# Patient Record
Sex: FEMALE | Race: WHITE | Employment: OTHER
[De-identification: names, ages, dates, MRNs, and addresses within clinical notes are randomized per-mention and may not be internally consistent; named-entity substitution may affect disease eponyms.]

---

## 2023-10-02 ENCOUNTER — HOSPITAL ENCOUNTER (OUTPATIENT)
Dept: PHYSICAL THERAPY | Facility: HOSPITAL | Age: 68
Setting detail: RECURRING SERIES
Discharge: HOME | End: 2023-10-05
Payer: MEDICARE

## 2023-10-02 PROCEDURE — 97161 PT EVAL LOW COMPLEX 20 MIN: CPT | Performed by: PHYSICAL THERAPIST

## 2023-10-02 NOTE — THERAPY EVALUATION
she is able to walk faster. She has been avoiding hip flexion activities for the past 2 weeks and her pain much better overall. She is no longer experiencing pain at 3/4 mile. Start of Care: 10/2/2023  PLOF: complete 20 minutes of exercise at least 3 times a week  Limitations to PLOF/Activity or Recreational Limitations: NA  Work Hx: retired  Living Situation: independent  Mobility: independent  [facility]: independent  Previous Treatment/Compliance: none  PMHx/Surgical Hx/Comorbidites: Osteoporosis  Prior Hospitalization:  NA  Barriers: []pain []Financial []time []transportation []Other:  Substance use: []Alcohol []Tobacco []other:   Pt Goals: resume normal activity without pain or reinjury  Motivation: high  Cognition: A & O x 3              OBJECTIVE    Posture:  even weight distribution in standing  Other Observations:  --  Gait and Functional Mobility:  squat indicated deviation to the right LE  Palpation: tenderness at rectus femoris proximal muscle region    Left Hip PROM:  Flex 108 P!, ER 60, IR 25 P!   Right Hip PROM: Flex 145, ER 60, IR 30    Joint Mobility Assessment: left hip inferior hypomobility    Flexibility: --      LOWER QUARTER   MUSCLE STRENGTH  KEY       R  L  0 - No Contraction  Knee ext  5  5  1 - Trace            flex  5  5  2 - Poor   Hip ext   5  5  3 - Fair          flex   5  4 P!  4 - Good         abd  5  5  5 - Normal         add  5  5      Ankle DF  5  5                PF  5  5                INV  5  5                EV  5  5      Neurological: Reflexes / Sensations: normal  Special Tests:       Hip Compression: Left positive     Hip Scour: left positive        Objective/Functional Outcome Measure:   FOTO Score: 91/100  FOTO score = an established functional score where 100 = no disability      55 min [x]Eval - untimed                        Pain Level at end of session (0-10 scale): 0/10    Plan of Care / Statement of Necessity for Physical Therapy Services     Assessment / key

## 2023-10-06 ENCOUNTER — HOSPITAL ENCOUNTER (OUTPATIENT)
Dept: PHYSICAL THERAPY | Facility: HOSPITAL | Age: 68
Setting detail: RECURRING SERIES
Discharge: HOME | End: 2023-10-09
Payer: MEDICARE

## 2023-10-06 PROCEDURE — 97140 MANUAL THERAPY 1/> REGIONS: CPT | Performed by: PHYSICAL THERAPIST

## 2023-10-06 PROCEDURE — 97110 THERAPEUTIC EXERCISES: CPT | Performed by: PHYSICAL THERAPIST

## 2023-10-06 NOTE — PROGRESS NOTES
PHYSICAL THERAPY - MEDICARE DAILY TREATMENT NOTE (updated 3/23)      Date: 10/6/2023          Patient Name:  Ramón Lucero :  1955   Medical   Diagnosis:  Left hip pain [M25.552] Treatment Diagnosis:  M25.552  LEFT HIP PAIN    Referral Source:  Carolina Baumgarten, MD Insurance:   Payor: MEDICARE / Plan: MEDICARE PART A AND B / Product Type: *No Product type* /                     Patient  verified yes     Visit #   Current  / Total 2 16   Time   In / Out 833a 938a   Total Treatment Time 65   Total Timed Codes 65   1:1 Treatment Time 61      [facility] Totals Reminder:  bill using total billable   min of TIMED therapeutic procedures and modalities. 8-22 min = 1 unit; 23-37 min = 2 units; 38-52 min = 3 units; 53-67 min = 4 units; 68-82 min = 5 units            SUBJECTIVE    Pain Level (0-10 scale): 1/10    Any medication changes, allergies to medications, adverse drug reactions, diagnosis change, or new procedure performed?: [x] No    [] Yes (see summary sheet for update)  Medications: Verified on Patient Summary List    Subjective functional status/changes:     Pt states that her left hip is more sore today as she walked through some tall grass yesterday requiring greater hip flexion compared to normal walking. OBJECTIVE      Therapeutic Procedures: Tx Min Billable or 1:1 Min (if diff from Tx Min) Procedure, Rationale, Specifics   57 [phone] [id number] Therapeutic Exercise (timed):  increase ROM, strength, coordination, balance, and proprioception to improve patient's ability to progress to PLOF and address remaining functional goals. (see flow sheet as applicable)     Details if applicable:  passive hip ER and IR   8  56423 Manual Therapy (timed):  decrease pain, increase ROM, and increase tissue extensibility to improve patient's ability to progress to PLOF and address remaining functional goals.   The manual therapy interventions were performed at a separate and distinct time from the therapeutic

## 2023-10-10 ENCOUNTER — HOSPITAL ENCOUNTER (OUTPATIENT)
Dept: PHYSICAL THERAPY | Facility: HOSPITAL | Age: 68
Setting detail: RECURRING SERIES
Discharge: HOME | End: 2023-10-13
Payer: MEDICARE

## 2023-10-10 PROCEDURE — 97110 THERAPEUTIC EXERCISES: CPT

## 2023-10-10 PROCEDURE — 97140 MANUAL THERAPY 1/> REGIONS: CPT

## 2023-10-10 NOTE — PROGRESS NOTES
in tall grass without pain- progressing    PLAN  Yes  Continue plan of care  Re-Cert Due: 5/2/01  [x]  Upgrade activities as tolerated  []  Discharge due to :  []  Other:      Mayo Pastor, PT       10/10/2023       9:04 AM

## 2023-10-13 ENCOUNTER — APPOINTMENT (OUTPATIENT)
Dept: PHYSICAL THERAPY | Facility: HOSPITAL | Age: 68
End: 2023-10-13
Payer: MEDICARE

## 2023-10-17 ENCOUNTER — HOSPITAL ENCOUNTER (OUTPATIENT)
Dept: PHYSICAL THERAPY | Facility: HOSPITAL | Age: 68
Setting detail: RECURRING SERIES
Discharge: HOME | End: 2023-10-20
Payer: MEDICARE

## 2023-10-17 PROCEDURE — 97140 MANUAL THERAPY 1/> REGIONS: CPT | Performed by: PHYSICAL THERAPIST

## 2023-10-17 PROCEDURE — 97110 THERAPEUTIC EXERCISES: CPT | Performed by: PHYSICAL THERAPIST

## 2023-10-17 NOTE — PROGRESS NOTES
separate and distinct time from the therapeutic activities interventions . (see flow sheet as applicable)     Details if applicable:    left hip distraction and inferior mobilization grade 3-4     62 48    Total Total         [x]  Patient Education billed concurrently with other procedures   [x] Review HEP    [] Progressed/Changed HEP, detail:    [] Other detail:         Other Objective/Functional Measures  Hip PROM IR 26 P! --> 39    Pain Level at end of session (0-10 scale): 0/10      Assessment   Advance hip IR ROM following manual.  Progressing resistance load without pain. Patient will continue to benefit from skilled PT / OT services to modify and progress therapeutic interventions, analyze and address functional mobility deficits, analyze and address ROM deficits, analyze and address strength deficits, analyze and address soft tissue restrictions, analyze and cue for proper movement patterns, analyze and modify for postural abnormalities, and instruct in home and community integration to address functional deficits and attain remaining goals. Progress toward goals / Updated goals:  []  See Progress Note/Recertification    Short Term Goals: To be accomplished in 4-6 treatments. 1)  Pt will be Independent with HEP- progressing  2)  Pt will be able to stand from sitting >/= 30 minutes without pain- progressing  3) Pt will be able to Ambulate >/= 2 miles without pain- progressing     Long Term Goals: To be accomplished in 12-16 treatments.   1)  Pt will be able to Navigate one flight of stairs without pain- progressing  2)  Pt will be able to Ambulate >/= 2.5 miles without pain- progressing  3) Pt will be able to squat to retrieve item from ground without increase of pain- progressing  4) Pt will be wilberto to walk in tall grass without pain- progressing    PLAN  Yes  Continue plan of care  Re-Cert Due: 5/9/77  [x]  Upgrade activities as tolerated  []  Discharge due to :  []  Other:      Kaylah Germain, PT

## 2023-10-20 ENCOUNTER — HOSPITAL ENCOUNTER (OUTPATIENT)
Dept: PHYSICAL THERAPY | Facility: HOSPITAL | Age: 68
Setting detail: RECURRING SERIES
Discharge: HOME | End: 2023-10-23
Payer: MEDICARE

## 2023-10-20 PROCEDURE — 97140 MANUAL THERAPY 1/> REGIONS: CPT | Performed by: PHYSICAL THERAPIST

## 2023-10-20 PROCEDURE — 97110 THERAPEUTIC EXERCISES: CPT | Performed by: PHYSICAL THERAPIST

## 2023-10-20 NOTE — PROGRESS NOTES
PHYSICAL THERAPY - MEDICARE DAILY TREATMENT NOTE (updated 3/23)      Date: 10/20/2023          Patient Name:  Zoya Vega :  1955   Medical   Diagnosis:  Left hip pain [M25.552] Treatment Diagnosis:  M25.552  LEFT HIP PAIN    Referral Source:  Saul Arreola MD Insurance:   Payor: MEDICARE / Plan: MEDICARE PART A AND B / Product Type: *No Product type* /                     Patient  verified yes     Visit #   Current  / Total 5 16   Time   In / Out 917a 1006a   Total Treatment Time 49   Total Timed Codes 49   1:1 Treatment Time 40      [facility] Totals Reminder:  bill using total billable   min of TIMED therapeutic procedures and modalities. 8-22 min = 1 unit; 23-37 min = 2 units; 38-52 min = 3 units; 53-67 min = 4 units; 68-82 min = 5 units            SUBJECTIVE    Pain Level (0-10 scale): 0/10    Any medication changes, allergies to medications, adverse drug reactions, diagnosis change, or new procedure performed?: [x] No    [] Yes (see summary sheet for update)  Medications: Verified on Patient Summary List    Subjective functional status/changes: The Pt reports that she is noticing her left knee occasionally painful navigating stairs. OBJECTIVE      Therapeutic Procedures: Tx Min Billable or 1:1 Min (if diff from Tx Min) Procedure, Rationale, Specifics   41 [phone] [id number] Therapeutic Exercise (timed):  increase ROM, strength, coordination, balance, and proprioception to improve patient's ability to progress to PLOF and address remaining functional goals. (see flow sheet as applicable)     Details if applicable:  Passive hip IR, manual resisted hip ER/IR at 0 degrees hip flexion    8  26283 Manual Therapy (timed):  decrease pain, increase ROM, increase tissue extensibility, and increase postural awareness to improve patient's ability to progress to PLOF and address remaining functional goals.   The manual therapy interventions were performed at a separate and distinct time from the

## 2023-10-24 ENCOUNTER — HOSPITAL ENCOUNTER (OUTPATIENT)
Dept: PHYSICAL THERAPY | Facility: HOSPITAL | Age: 68
Setting detail: RECURRING SERIES
Discharge: HOME | End: 2023-10-27
Payer: MEDICARE

## 2023-10-24 PROCEDURE — 97140 MANUAL THERAPY 1/> REGIONS: CPT | Performed by: PHYSICAL THERAPIST

## 2023-10-24 PROCEDURE — 97110 THERAPEUTIC EXERCISES: CPT | Performed by: PHYSICAL THERAPIST

## 2023-10-24 NOTE — PROGRESS NOTES
PHYSICAL THERAPY - MEDICARE DAILY TREATMENT NOTE (updated 3/23)      Date: 10/24/2023          Patient Name:  Ramón Lucero :  1955   Medical   Diagnosis:  Left hip pain [M25.552] Treatment Diagnosis:  M25.552  LEFT HIP PAIN    Referral Source:  Carolina Baumgarten, MD Insurance:   Payor: MEDICARE / Plan: MEDICARE PART A AND B / Product Type: *No Product type* /                     Patient  verified yes     Visit #   Current  / Total 6 16   Time   In / Out 834a 928a   Total Treatment Time 56   Total Timed Codes 56   1:1 Treatment Time 46      [facility] Totals Reminder:  bill using total billable   min of TIMED therapeutic procedures and modalities. 8-22 min = 1 unit; 23-37 min = 2 units; 38-52 min = 3 units; 53-67 min = 4 units; 68-82 min = 5 units            SUBJECTIVE    Pain Level (0-10 scale): 0/10    Any medication changes, allergies to medications, adverse drug reactions, diagnosis change, or new procedure performed?: [x] No    [] Yes (see summary sheet for update)  Medications: Verified on Patient Summary List    Subjective functional status/changes:     Patient reports that she is still having pain with steps and with walking on occasion lasting approx 5 minutes then resolved. OBJECTIVE      Therapeutic Procedures: Tx Min Billable or 1:1 Min (if diff from Tx Min) Procedure, Rationale, Specifics   84 10 [id number] Therapeutic Exercise (timed):  increase ROM, strength, coordination, balance, and proprioception to improve patient's ability to progress to PLOF and address remaining functional goals. (see flow sheet as applicable)     Details if applicable:  Passive hip IR, manual resisted hip ER/IR at 0 degrees hip flexion    8  00676 Manual Therapy (timed):  decrease pain, increase ROM, increase tissue extensibility, and increase postural awareness to improve patient's ability to progress to PLOF and address remaining functional goals.   The manual therapy interventions were performed at a

## 2023-10-27 ENCOUNTER — APPOINTMENT (OUTPATIENT)
Dept: PHYSICAL THERAPY | Facility: HOSPITAL | Age: 68
End: 2023-10-27
Payer: MEDICARE

## 2023-10-31 ENCOUNTER — HOSPITAL ENCOUNTER (OUTPATIENT)
Dept: PHYSICAL THERAPY | Facility: HOSPITAL | Age: 68
Setting detail: RECURRING SERIES
Discharge: HOME | End: 2023-11-03
Payer: MEDICARE

## 2023-10-31 PROCEDURE — 97530 THERAPEUTIC ACTIVITIES: CPT | Performed by: PHYSICAL THERAPIST

## 2023-10-31 PROCEDURE — 97110 THERAPEUTIC EXERCISES: CPT | Performed by: PHYSICAL THERAPIST

## 2023-10-31 PROCEDURE — 97140 MANUAL THERAPY 1/> REGIONS: CPT | Performed by: PHYSICAL THERAPIST

## 2023-10-31 NOTE — PROGRESS NOTES
Note/Recertification    Short Term Goals: To be accomplished in 4-6 treatments. 1)  Pt will be Independent with HEP- MET  2)  Pt will be able to stand from sitting >/= 30 minutes without pain- MET  3) Pt will be able to Ambulate >/= 2 miles without pain- MET     Long Term Goals: To be accomplished in 12-16 treatments.   1)  Pt will be able to Navigate one flight of stairs without pain- progressing  2)  Pt will be able to Ambulate >/= 2.5 miles without pain- progressing  3) Pt will be able to squat to retrieve item from ground without increase of pain- progressing  4) Pt will be wilberto to walk in tall grass without pain- progressing    PLAN  Yes  Continue plan of care  Re-Cert Due: 0/9/46  [x]  Upgrade activities as tolerated  []  Discharge due to :  []  Other:      Brett Olmstead, PT       10/31/2023       9:24 AM

## 2023-11-03 ENCOUNTER — HOSPITAL ENCOUNTER (OUTPATIENT)
Dept: PHYSICAL THERAPY | Facility: HOSPITAL | Age: 68
Setting detail: RECURRING SERIES
Discharge: HOME | End: 2023-11-06
Payer: MEDICARE

## 2023-11-03 PROCEDURE — 97140 MANUAL THERAPY 1/> REGIONS: CPT | Performed by: PHYSICAL THERAPIST

## 2023-11-03 PROCEDURE — 97530 THERAPEUTIC ACTIVITIES: CPT | Performed by: PHYSICAL THERAPIST

## 2023-11-03 PROCEDURE — 97110 THERAPEUTIC EXERCISES: CPT | Performed by: PHYSICAL THERAPIST

## 2023-11-03 NOTE — PROGRESS NOTES
Physical Therapy at [facility],   a part of [address] [place] [address] [address], [address]  Phone: [phone]  Fax: [phone]      PHYSICAL THERAPY PROGRESS NOTE  Patient Name:  M[place]el Hill :  1955   Treatment/Medical Diagnosis: Left hip pain [M25.552]   Referral Source:  Craig Corey MD     Date of Initial Visit:  10/2/2023 Attended Visits:  8 Missed Visits:  0     SUMMARY OF TREATMENT/ASSESSMENT:  Mrs. Fitzgerald Sprain is no longer experiencing an pain int he hip with walking. Hip ER and IR are no longer painful at end range. She is still having pain on occasion with stair navigation that we have been able to eliminate with specific movement being aware of her Hip ER+ABD movement. Will continue Plan of Care to address weakness and proprioception deficit. PROM Hip IR 42    CURRENT STATUS  Short Term Goals: To be accomplished in 4-6 treatments. 1)  Pt will be Independent with HEP- MET  2)  Pt will be able to stand from sitting >/= 30 minutes without pain- MET  3) Pt will be able to Ambulate >/= 2 miles without pain- MET     Long Term Goals: To be accomplished in 12-16 treatments. 1)  Pt will be able to Navigate one flight of stairs without pain- progressing  2)  Pt will be able to Ambulate >/= 2.5 miles without pain- progressing  3) Pt will be able to squat to retrieve item from ground without increase of pain- progressing  4) Pt will be wilberto to walk in tall grass without pain- progressing           RECOMMENDATIONS  Continue per plan of care. Kaylah Germain, PT       11/3/2023       3:30 PM    If you have any questions/comments please contact us directly at [phone]. Thank you for allowing us to assist in the care of your patient.

## 2023-11-03 NOTE — PROGRESS NOTES
PHYSICAL THERAPY - MEDICARE DAILY TREATMENT NOTE (updated 3/23)      Date: 11/3/2023          Patient Name:  Michoacano Mason :  1955   Medical   Diagnosis:  Left hip pain [M25.552] Treatment Diagnosis:  M25.552  LEFT HIP PAIN    Referral Source:  Lavelle Burciaga MD Insurance:   Payor: MEDICARE / Plan: MEDICARE PART A AND B / Product Type: *No Product type* /                     Patient  verified yes     Visit #   Current  / Total 8 16   Time   In / Out 915a 1007a   Total Treatment Time 52   Total Timed Codes 52   1:1 Treatment Time 52      [facility] Totals Reminder:  bill using total billable   min of TIMED therapeutic procedures and modalities. 8-22 min = 1 unit; 23-37 min = 2 units; 38-52 min = 3 units; 53-67 min = 4 units; 68-82 min = 5 units            SUBJECTIVE    Pain Level (0-10 scale): 0/10    Any medication changes, allergies to medications, adverse drug reactions, diagnosis change, or new procedure performed?: [x] No    [] Yes (see summary sheet for update)  Medications: Verified on Patient Summary List    Subjective functional status/changes:     Patient reports that she did not have any pain with her squats at home but is still having pain with navigating stairs. OBJECTIVE      Therapeutic Procedures: Tx Min Billable or 1:1 Min (if diff from Tx Min) Procedure, Rationale, Specifics   24 19 [id number] Therapeutic Exercise (timed):  increase ROM, strength, coordination, balance, and proprioception to improve patient's ability to progress to PLOF and address remaining functional goals. (see flow sheet as applicable)     Details if applicable:  Passive hip IR, manual resisted hip ER/IR at 0 degrees hip flexion    20  82680 Therapeutic Activity (timed):  use of dynamic activities replicating functional movements to increase ROM, strength, coordination, balance, and proprioception in order to improve patient's ability to progress to PLOF and address remaining functional goals.   (see flow

## 2023-11-07 ENCOUNTER — HOSPITAL ENCOUNTER (OUTPATIENT)
Dept: PHYSICAL THERAPY | Facility: HOSPITAL | Age: 68
Setting detail: RECURRING SERIES
Discharge: HOME | End: 2023-11-10
Payer: MEDICARE

## 2023-11-07 PROCEDURE — 97110 THERAPEUTIC EXERCISES: CPT | Performed by: PHYSICAL THERAPIST

## 2023-11-07 PROCEDURE — 97530 THERAPEUTIC ACTIVITIES: CPT | Performed by: PHYSICAL THERAPIST

## 2023-11-10 ENCOUNTER — HOSPITAL ENCOUNTER (OUTPATIENT)
Dept: PHYSICAL THERAPY | Facility: HOSPITAL | Age: 68
Setting detail: RECURRING SERIES
Discharge: HOME | End: 2023-11-13
Payer: MEDICARE

## 2023-11-10 PROCEDURE — 97530 THERAPEUTIC ACTIVITIES: CPT | Performed by: PHYSICAL THERAPIST

## 2023-11-10 PROCEDURE — 97140 MANUAL THERAPY 1/> REGIONS: CPT | Performed by: PHYSICAL THERAPIST

## 2023-11-10 NOTE — PROGRESS NOTES
PHYSICAL THERAPY - MEDICARE DAILY TREATMENT NOTE (updated 3/23)      Date: 11/10/2023          Patient Name:  Murali Sanchez :  1955   Medical   Diagnosis:  Left hip pain [M25.552] Treatment Diagnosis:  M25.552  LEFT HIP PAIN    Referral Source:  Qing Us MD Insurance:   Payor: MEDICARE / Plan: MEDICARE PART A AND B / Product Type: *No Product type* /                     Patient  verified yes     Visit #   Current  / Total 10 16   Time   In / Out 917a 1022a   Total Treatment Time 65   Total Timed Codes 65   1:1 Treatment Time 54      [facility] Totals Reminder:  bill using total billable   min of TIMED therapeutic procedures and modalities. 8-22 min = 1 unit; 23-37 min = 2 units; 38-52 min = 3 units; 53-67 min = 4 units; 68-82 min = 5 units            SUBJECTIVE    Pain Level (0-10 scale): 0/10    Any medication changes, allergies to medications, adverse drug reactions, diagnosis change, or new procedure performed?: [x] No    [] Yes (see summary sheet for update)  Medications: Verified on Patient Summary List    Subjective functional status/changes:     Patient reports that her knees are hurting a little more since last night. OBJECTIVE      Therapeutic Procedures: Tx Min Billable or 1:1 Min (if diff from Tx Min) Procedure, Rationale, Specifics   27 17 [id number] Therapeutic Exercise (timed):  increase ROM, strength, coordination, balance, and proprioception to improve patient's ability to progress to PLOF and address remaining functional goals. (see flow sheet as applicable)     Details if applicable:  Prone quad stretching   30  70248 Therapeutic Activity (timed):  use of dynamic activities replicating functional movements to increase ROM, strength, coordination, balance, and proprioception in order to improve patient's ability to progress to PLOF and address remaining functional goals.   (see flow sheet as applicable)    Details if applicable:   Sit-Stand perform   8  63979 Manual

## 2023-11-14 ENCOUNTER — HOSPITAL ENCOUNTER (OUTPATIENT)
Dept: PHYSICAL THERAPY | Facility: HOSPITAL | Age: 68
Setting detail: RECURRING SERIES
Discharge: HOME | End: 2023-11-17
Payer: MEDICARE

## 2023-11-14 PROCEDURE — 97140 MANUAL THERAPY 1/> REGIONS: CPT | Performed by: PHYSICAL THERAPIST

## 2023-11-14 PROCEDURE — 97110 THERAPEUTIC EXERCISES: CPT | Performed by: PHYSICAL THERAPIST

## 2023-11-14 PROCEDURE — 97530 THERAPEUTIC ACTIVITIES: CPT | Performed by: PHYSICAL THERAPIST

## 2023-11-14 NOTE — PROGRESS NOTES
Therapy (timed):  decrease pain, increase ROM, increase tissue extensibility, and increase postural awareness to improve patient's ability to progress to PLOF and address remaining functional goals. The manual therapy interventions were performed at a separate and distinct time from the therapeutic activities interventions . (see flow sheet as applicable)     Details if applicable:    Right and Left medial patella mobilization grade 3-4 with instruction for self-mobilization   65 55    Total Total         [x]  Patient Education billed concurrently with other procedures   [x] Review HEP    [] Progressed/Changed HEP, detail:    [] Other detail:         Other Objective/Functional Measures    Pain Level at end of session (0-10 scale): 0/10      Assessment   Required cueing for correction of hip IR+Add during sit-stand performance. Medial patella mobilization was effective at resolving patellofemoral pain during sit-stand. Patient will continue to benefit from skilled PT / OT services to modify and progress therapeutic interventions, analyze and address functional mobility deficits, analyze and address ROM deficits, analyze and address strength deficits, analyze and address soft tissue restrictions, analyze and cue for proper movement patterns, analyze and modify for postural abnormalities, and instruct in home and community integration to address functional deficits and attain remaining goals. Progress toward goals / Updated goals:  []  See Progress Note/Recertification    Short Term Goals: To be accomplished in 4-6 treatments. 1)  Pt will be Independent with HEP- MET  2)  Pt will be able to stand from sitting >/= 30 minutes without pain- MET  3) Pt will be able to Ambulate >/= 2 miles without pain- MET     Long Term Goals: To be accomplished in 12-16 treatments.   1)  Pt will be able to Navigate one flight of stairs without pain- progressing  2)  Pt will be able to Ambulate >/= 2.5 miles without pain-

## 2023-11-17 ENCOUNTER — APPOINTMENT (OUTPATIENT)
Dept: PHYSICAL THERAPY | Facility: HOSPITAL | Age: 68
End: 2023-11-17
Payer: MEDICARE

## 2023-11-28 ENCOUNTER — HOSPITAL ENCOUNTER (OUTPATIENT)
Dept: PHYSICAL THERAPY | Facility: HOSPITAL | Age: 68
Setting detail: RECURRING SERIES
Discharge: HOME | End: 2023-12-01
Payer: MEDICARE

## 2023-11-28 PROCEDURE — 97110 THERAPEUTIC EXERCISES: CPT | Performed by: PHYSICAL THERAPIST

## 2023-11-28 NOTE — PROGRESS NOTES
progressing  3) Pt will be able to squat to retrieve item from ground without increase of pain- progressing  4) Pt will be wilberto to walk in tall grass without pain- progressing    PLAN  Yes  Continue plan of care  Re-Cert Due: 9/7/02  [x]  Upgrade activities as tolerated  []  Discharge due to :  []  Other:      Aubree Quesada, PT       11/28/2023       5:09 PM